# Patient Record
Sex: MALE | Race: BLACK OR AFRICAN AMERICAN | HISPANIC OR LATINO | Employment: FULL TIME | URBAN - METROPOLITAN AREA
[De-identification: names, ages, dates, MRNs, and addresses within clinical notes are randomized per-mention and may not be internally consistent; named-entity substitution may affect disease eponyms.]

---

## 2023-04-27 ENCOUNTER — APPOINTMENT (OUTPATIENT)
Dept: MRI IMAGING | Facility: CLINIC | Age: 48
End: 2023-04-27
Attending: EMERGENCY MEDICINE
Payer: COMMERCIAL

## 2023-04-27 ENCOUNTER — HOSPITAL ENCOUNTER (EMERGENCY)
Facility: CLINIC | Age: 48
Discharge: HOME | End: 2023-04-27
Attending: EMERGENCY MEDICINE | Admitting: EMERGENCY MEDICINE
Payer: COMMERCIAL

## 2023-04-27 ENCOUNTER — APPOINTMENT (OUTPATIENT)
Dept: CT IMAGING | Facility: CLINIC | Age: 48
End: 2023-04-27
Attending: EMERGENCY MEDICINE
Payer: COMMERCIAL

## 2023-04-27 VITALS
SYSTOLIC BLOOD PRESSURE: 140 MMHG | WEIGHT: 200 LBS | HEART RATE: 60 BPM | TEMPERATURE: 98.1 F | DIASTOLIC BLOOD PRESSURE: 86 MMHG | HEIGHT: 73 IN | RESPIRATION RATE: 16 BRPM | OXYGEN SATURATION: 99 % | BODY MASS INDEX: 26.51 KG/M2

## 2023-04-27 DIAGNOSIS — G51.0 BELL'S PALSY: ICD-10-CM

## 2023-04-27 LAB
ANION GAP SERPL CALCULATED.3IONS-SCNC: 10 MMOL/L (ref 5–18)
APTT PPP: 28 SECONDS (ref 22–38)
BASOPHILS # BLD AUTO: 0 10E3/UL (ref 0–0.2)
BASOPHILS NFR BLD AUTO: 0 %
BUN SERPL-MCNC: 12 MG/DL (ref 8–22)
CALCIUM SERPL-MCNC: 9.7 MG/DL (ref 8.5–10.5)
CHLORIDE BLD-SCNC: 103 MMOL/L (ref 98–107)
CO2 SERPL-SCNC: 28 MMOL/L (ref 22–31)
CREAT SERPL-MCNC: 1.06 MG/DL (ref 0.7–1.3)
EOSINOPHIL # BLD AUTO: 0.2 10E3/UL (ref 0–0.7)
EOSINOPHIL NFR BLD AUTO: 4 %
ERYTHROCYTE [DISTWIDTH] IN BLOOD BY AUTOMATED COUNT: 12.1 % (ref 10–15)
GFR SERPL CREATININE-BSD FRML MDRD: 87 ML/MIN/1.73M2
GLUCOSE BLD-MCNC: 89 MG/DL (ref 70–125)
GLUCOSE BLDC GLUCOMTR-MCNC: 84 MG/DL (ref 70–99)
HCT VFR BLD AUTO: 43 % (ref 40–53)
HGB BLD-MCNC: 13.8 G/DL (ref 13.3–17.7)
IMM GRANULOCYTES # BLD: 0 10E3/UL
IMM GRANULOCYTES NFR BLD: 0 %
INR PPP: 0.95 (ref 0.85–1.15)
LYMPHOCYTES # BLD AUTO: 2.3 10E3/UL (ref 0.8–5.3)
LYMPHOCYTES NFR BLD AUTO: 47 %
MCH RBC QN AUTO: 27.4 PG (ref 26.5–33)
MCHC RBC AUTO-ENTMCNC: 32.1 G/DL (ref 31.5–36.5)
MCV RBC AUTO: 85 FL (ref 78–100)
MONOCYTES # BLD AUTO: 0.4 10E3/UL (ref 0–1.3)
MONOCYTES NFR BLD AUTO: 7 %
NEUTROPHILS # BLD AUTO: 2.1 10E3/UL (ref 1.6–8.3)
NEUTROPHILS NFR BLD AUTO: 42 %
NRBC # BLD AUTO: 0 10E3/UL
NRBC BLD AUTO-RTO: 0 /100
PLATELET # BLD AUTO: 230 10E3/UL (ref 150–450)
POTASSIUM BLD-SCNC: 3.9 MMOL/L (ref 3.5–5)
RBC # BLD AUTO: 5.04 10E6/UL (ref 4.4–5.9)
SODIUM SERPL-SCNC: 141 MMOL/L (ref 136–145)
TROPONIN I SERPL-MCNC: <0.01 NG/ML (ref 0–0.29)
WBC # BLD AUTO: 5 10E3/UL (ref 4–11)

## 2023-04-27 PROCEDURE — 70450 CT HEAD/BRAIN W/O DYE: CPT | Mod: XU

## 2023-04-27 PROCEDURE — 70498 CT ANGIOGRAPHY NECK: CPT

## 2023-04-27 PROCEDURE — 99285 EMERGENCY DEPT VISIT HI MDM: CPT | Mod: 25

## 2023-04-27 PROCEDURE — 70496 CT ANGIOGRAPHY HEAD: CPT

## 2023-04-27 PROCEDURE — 85014 HEMATOCRIT: CPT | Performed by: EMERGENCY MEDICINE

## 2023-04-27 PROCEDURE — 85610 PROTHROMBIN TIME: CPT | Performed by: EMERGENCY MEDICINE

## 2023-04-27 PROCEDURE — 250N000011 HC RX IP 250 OP 636: Performed by: EMERGENCY MEDICINE

## 2023-04-27 PROCEDURE — 250N000012 HC RX MED GY IP 250 OP 636 PS 637: Performed by: EMERGENCY MEDICINE

## 2023-04-27 PROCEDURE — 36415 COLL VENOUS BLD VENIPUNCTURE: CPT | Performed by: EMERGENCY MEDICINE

## 2023-04-27 PROCEDURE — 84484 ASSAY OF TROPONIN QUANT: CPT | Performed by: EMERGENCY MEDICINE

## 2023-04-27 PROCEDURE — 99207 PR NO BILLABLE SERVICE THIS VISIT: CPT | Performed by: PHYSICIAN ASSISTANT

## 2023-04-27 PROCEDURE — A9585 GADOBUTROL INJECTION: HCPCS | Performed by: EMERGENCY MEDICINE

## 2023-04-27 PROCEDURE — 255N000002 HC RX 255 OP 636: Performed by: EMERGENCY MEDICINE

## 2023-04-27 PROCEDURE — 85730 THROMBOPLASTIN TIME PARTIAL: CPT | Performed by: EMERGENCY MEDICINE

## 2023-04-27 PROCEDURE — 80048 BASIC METABOLIC PNL TOTAL CA: CPT | Performed by: EMERGENCY MEDICINE

## 2023-04-27 PROCEDURE — 93005 ELECTROCARDIOGRAM TRACING: CPT | Performed by: EMERGENCY MEDICINE

## 2023-04-27 PROCEDURE — 82962 GLUCOSE BLOOD TEST: CPT

## 2023-04-27 PROCEDURE — 70553 MRI BRAIN STEM W/O & W/DYE: CPT

## 2023-04-27 RX ORDER — PREDNISONE 20 MG/1
TABLET ORAL
Qty: 23 TABLET | Refills: 0 | Status: SHIPPED | OUTPATIENT
Start: 2023-04-27

## 2023-04-27 RX ORDER — GADOBUTROL 604.72 MG/ML
9 INJECTION INTRAVENOUS ONCE
Status: COMPLETED | OUTPATIENT
Start: 2023-04-27 | End: 2023-04-27

## 2023-04-27 RX ORDER — IOPAMIDOL 755 MG/ML
75 INJECTION, SOLUTION INTRAVASCULAR ONCE
Status: COMPLETED | OUTPATIENT
Start: 2023-04-27 | End: 2023-04-27

## 2023-04-27 RX ORDER — PREDNISONE 20 MG/1
60 TABLET ORAL ONCE
Status: COMPLETED | OUTPATIENT
Start: 2023-04-27 | End: 2023-04-27

## 2023-04-27 RX ADMIN — PREDNISONE 60 MG: 20 TABLET ORAL at 17:48

## 2023-04-27 RX ADMIN — IOPAMIDOL 75 ML: 755 INJECTION, SOLUTION INTRAVENOUS at 14:58

## 2023-04-27 RX ADMIN — GADOBUTROL 9 ML: 604.72 INJECTION INTRAVENOUS at 16:52

## 2023-04-27 ASSESSMENT — ENCOUNTER SYMPTOMS
WEAKNESS: 0
FACIAL ASYMMETRY: 1
NUMBNESS: 0

## 2023-04-27 ASSESSMENT — ACTIVITIES OF DAILY LIVING (ADL)
ADLS_ACUITY_SCORE: 35
ADLS_ACUITY_SCORE: 35

## 2023-04-27 NOTE — ED PROVIDER NOTES
EMERGENCY DEPARTMENT ENCOUNTER      NAME: Fabián Dominguez  AGE: 48 year old male  YOB: 1975  MRN: [id number]  EVALUATION DATE & TIME: 4/27/2023  2:47 PM    PCP: No primary care provider on file.    ED PROVIDER: Lazara Andrade MD    Chief Complaint   Patient presents with     Facial Droop         FINAL IMPRESSION:  1. Bell's palsy          ED COURSE & MEDICAL DECISION MAKING:    Pertinent Labs & Imaging studies reviewed. (See chart for details)  48 year old male presents to the Emergency Department for evaluation of     Nursing note were reviewed.  Past Medical records were reviewed.  Differential diagnosis includes CVA (either ischemic or hemorrhagic), hypoglycemia, complex Migraine, CNS infection, Mass, MS, drug intoxication, Seizure with post-ictal Jean Carlos's paralysis, and Bell's Palsy.     Blood sugar on arrival was normal and patient has no h/o of migraine or seizure and had no symptoms prior or during to this event to indicate seizure.    Patient is currently afebrile with noevidence of focal infection per history and physical exam to indicate CNS infection.     Stat head CT and CT angio were ordered soon after history, physical and brief neuro exam.    Head CT showed no bleed. Neurology was consulted and was involved with the care plan as noted below.     All labs were unremarkable.     EKG showed sinus rhythm, with no ST or T wave changes to suggest ischemia or infarction, no prior EKGs for comparison, normal QTc.    Repeat neurological exam showed no change.  After work-up in the emergency department, repeat exam, and discussions with neurology, we felt patient was safe to be discharged home with treatment for Bell's palsy.  I discussed this with patient, we began steroids at this time.  Patient will take his steroids as prescribed and follow-up closely with neurology and his primary care physician.      Patient discharged in good condition with questions answered. He was given epic discharge  instructions and follow-up recommendations. Patient will return to the ED if symptoms worsen or he develops any of the concerning signs/symptoms as outlined in the EPIC d/c instructions. Otherwise he will follow up in clinic as recommended in the d/c instructions.             12:43 PM Provider called to triage for a neuro exam. Exam performed, history obtained and plan for treatment discussed   12:44 PM T1 stroke code called  2:49 PM I spoke with stroke neurology  3:04 PM I spoke with [facility] regarding the patient's CT scan. They report the patient's initial non contrast CT scan is negative for stroke   3:09 PM Stroke code deescalated   3:10 PM I spoke with Stroke Neurology who recommends deescalating the stroke code and obtaining an MRI.   3:11 PM I updated the patient with imaging results and plan for MRI from Neurology  5:30 PM discussed MRI results with stroke neurology, who stated they felt comfortable that this was not a central etiology, and that patient could be discharged with treatment for Bell's palsy.    At the conclusion of the encounter I discussed the results of all of the tests and the disposition. The questions were answered. The patient or family acknowledged understanding and was agreeable with the care plan.       MEDICATIONS GIVEN IN THE EMERGENCY:  Medications   iopamidol (ISOVUE-370) solution 75 mL (75 mLs Intravenous $Given 4/27/23 8391)   gadobutrol (GADAVIST) injection 9 mL (9 mLs Intravenous $Given 4/27/23 2382)   predniSONE (DELTASONE) tablet 60 mg (60 mg Oral $Given 4/27/23 9695)       NEW PRESCRIPTIONS STARTED AT TODAY'S ER VISIT  Discharge Medication List as of 4/27/2023  5:42 PM      START taking these medications    Details   predniSONE (DELTASONE) 20 MG tablet Take 3 tabs by mouth daily x 4 days, then 2 tabs daily x 3 days, then 1 tab daily x 3 days, then 1/2 tab daily x 3 days., Disp-23 tablet, R-0, Local Print            Medical Decision  "Making    History:    Supplemental history from: Documented in chart, if applicable    External Record(s) reviewed: Documented in chart, if applicable.    Work Up:    Chart documentation includes differential considered and any EKGs or imaging independently interpreted by provider, where specified.    In additional to work up documented, I considered the following work up: Documented in chart, if applicable.    External consultation:    Discussion of management with another provider: Documented in chart, if applicable and Neurology and [facility]     Complicating factors:    Care impacted by chronic illness: Hyperlipidemia    Care affected by social determinants of health: N/A    Disposition considerations: Discharge. I prescribed additional prescription strength medication(s) as charted. See documentation for any additional details.      =================================================================    HPI    Patient information was obtained from: Patient     Use of : N/A      Fabián Dominguez is a 48 year old male with a pertinent history of HLD who presents to this ED via walk-in for evaluation of facial droop     The patient reports about two hours ago (around 12:30 PM, today) they noticed their right \"eye felt weird\" and they had a hard time closing it. The patient then noticed they hard a hard time smiling and talking due to a right sided facial droop. The patient denies difficulty walking or weakness or numbness in the arms or legs.   The patient notes they had fever and chills a couple of days ago that they thought was COVID. The patient denies those symptoms now.   The patient denies medical problems or taking any medications.    Of note, the patient's blood glucose was 84.     REVIEW OF SYSTEMS   Review of Systems   Neurological: Positive for facial asymmetry (Right sided facial droop). Negative for weakness and numbness.    10 point ROS negative with exception of those listed in the " "HPI.      PAST MEDICAL HISTORY:  No past medical history on file.    PAST SURGICAL HISTORY:  No past surgical history on file.        CURRENT MEDICATIONS:    predniSONE (DELTASONE) 20 MG tablet        ALLERGIES:  Allergies   Allergen Reactions     Sulfa Antibiotics Rash       FAMILY HISTORY:  No family history on file.    SOCIAL HISTORY:        VITALS:  BP (!) 140/86   Pulse 60   Temp 98.1  F (36.7  C) (Oral)   Resp 16   Ht 1.854 m (6' 1\")   Wt 90.7 kg (200 lb)   SpO2 99%   BMI 26.39 kg/m      PHYSICAL EXAM    General: Alert, sitting on the bed in NAD  Neuro: Awake alert &O x 3, no pronator drift present; strength 5/5 at elbow flexion; strength 5/5 at elbow extension with good  strength bilaterally; strength 5/5 at ankle dorsiflexion; strength 5/5 at ankle plantar flexion; tandem gait is in tact; finger-to-nose is intact and symmetrical; sensation is  intact to light touch throughout; CN normal except right sided facial droop, but the patient is able to lift both eyebrows equally  Head: atraumatic, no maxillary no frontal sinus tenderness  Nose:  no rhinnhorea  Eyes: Pupils 3mm equal round and reactive to light  Mouth: mmm  Neck: no carotid bruits  Cardiovascular: regular; S1/S2 ; radial pulses equal and symmetrical  Lungs: CTAB bilaterally  Abdomen:  soft non-tender +BS     LAB:  All pertinent labs reviewed and interpreted.  Results for orders placed or performed during the hospital encounter of 04/27/23   CT Head w/o Contrast    Impression    IMPRESSION:   HEAD CT:  1.  No acute transcortical infarct, intracranial hemorrhage, or mass effect.    HEAD CTA:  1.  No proximal branch vessel occlusion, flow-limiting stenosis, aneurysm, or vascular malformation.    NECK CTA:  1.  No flow-limiting stenosis or findings of dissection.   2.  Mildly enlarged left level 2A lymph nodes are nonspecific and may be reactive to recent illness. Apparent asymmetry with nonmasslike soft tissue fullness at the left oropharynx " is indeterminant. Correlate clinically and with direct visualization on   exam.    Jono Nguyen MD notified provider, TAZ MEZA, of CT head results via telephone at 4/27/2023 3:03 PM CDT, who acknowledge understanding. Discussed CTA results at 3:18 PM.   CTA Head Neck with Contrast    Impression    IMPRESSION:   HEAD CT:  1.  No acute transcortical infarct, intracranial hemorrhage, or mass effect.    HEAD CTA:  1.  No proximal branch vessel occlusion, flow-limiting stenosis, aneurysm, or vascular malformation.    NECK CTA:  1.  No flow-limiting stenosis or findings of dissection.   2.  Mildly enlarged left level 2A lymph nodes are nonspecific and may be reactive to recent illness. Apparent asymmetry with nonmasslike soft tissue fullness at the left oropharynx is indeterminant. Correlate clinically and with direct visualization on   exam.    Jono Nguyen MD notified provider, TAZ MEZA, of CT head results via telephone at 4/27/2023 3:03 PM CDT, who acknowledge understanding. Discussed CTA results at 3:18 PM.   MR Brain w/o & w Contrast    Impression    IMPRESSION:  1.  No acute intracranial process.  2.  Mild presumed microvascular ischemic changes as detailed above.   Basic metabolic panel   Result Value Ref Range    Sodium 141 136 - 145 mmol/L    Potassium 3.9 3.5 - 5.0 mmol/L    Chloride 103 98 - 107 mmol/L    Carbon Dioxide (CO2) 28 22 - 31 mmol/L    Anion Gap 10 5 - 18 mmol/L    Urea Nitrogen 12 8 - 22 mg/dL    Creatinine 1.06 0.70 - 1.30 mg/dL    Calcium 9.7 8.5 - 10.5 mg/dL    Glucose 89 70 - 125 mg/dL    GFR Estimate 87 >60 mL/min/1.73m2   Result Value Ref Range    INR 0.95 0.85 - 1.15   Partial thromboplastin time   Result Value Ref Range    aPTT 28 22 - 38 Seconds   Result Value Ref Range    Troponin I <0.01 0.00 - 0.29 ng/mL   CBC with platelets and differential   Result Value Ref Range    WBC Count 5.0 4.0 - 11.0 10e3/uL    RBC Count 5.04 4.40 - 5.90 10e6/uL    Hemoglobin 13.8 13.3 - 17.7  g/dL    Hematocrit 43.0 40.0 - 53.0 %    MCV 85 78 - 100 fL    MCH 27.4 26.5 - 33.0 pg    MCHC 32.1 31.5 - 36.5 g/dL    RDW 12.1 10.0 - 15.0 %    Platelet Count 230 150 - 450 10e3/uL    % Neutrophils 42 %    % Lymphocytes 47 %    % Monocytes 7 %    % Eosinophils 4 %    % Basophils 0 %    % Immature Granulocytes 0 %    NRBCs per 100 WBC 0 <1 /100    Absolute Neutrophils 2.1 1.6 - 8.3 10e3/uL    Absolute Lymphocytes 2.3 0.8 - 5.3 10e3/uL    Absolute Monocytes 0.4 0.0 - 1.3 10e3/uL    Absolute Eosinophils 0.2 0.0 - 0.7 10e3/uL    Absolute Basophils 0.0 0.0 - 0.2 10e3/uL    Absolute Immature Granulocytes 0.0 <=0.4 10e3/uL    Absolute NRBCs 0.0 10e3/uL   Glucose by meter   Result Value Ref Range    GLUCOSE BY METER POCT 84 70 - 99 mg/dL   ECG 12-LEAD WITH MUSE (LHE)   Result Value Ref Range    Systolic Blood Pressure 165 mmHg    Diastolic Blood Pressure 81 mmHg    Ventricular Rate 70 BPM    Atrial Rate 70 BPM    OK Interval 172 ms    QRS Duration 82 ms     ms    QTc 423 ms    P Axis 51 degrees    R AXIS -3 degrees    T Axis 18 degrees    Interpretation ECG       Sinus rhythm  Normal ECG  No previous ECGs available  Confirmed by SEE ED PROVIDER NOTE FOR, ECG INTERPRETATION ([id number]),  ANAIS MCKEON ([id number]) on 4/28/2023 1:18:58 AM         RADIOLOGY:  Reviewed all pertinent imaging. Please see official radiology report.  MR Brain w/o & w Contrast   Final Result   IMPRESSION:   1.  No acute intracranial process.   2.  Mild presumed microvascular ischemic changes as detailed above.      CTA Head Neck with Contrast   Final Result   IMPRESSION:    HEAD CT:   1.  No acute transcortical infarct, intracranial hemorrhage, or mass effect.      HEAD CTA:   1.  No proximal branch vessel occlusion, flow-limiting stenosis, aneurysm, or vascular malformation.      NECK CTA:   1.  No flow-limiting stenosis or findings of dissection.    2.  Mildly enlarged left level 2A lymph nodes are nonspecific and may be reactive to recent  illness. Apparent asymmetry with nonmasslike soft tissue fullness at the left oropharynx is indeterminant. Correlate clinically and with direct visualization on    exam.      Jono Nguyen MD notified provider, TAZ ANDRADE, of CT head results via telephone at 4/27/2023 3:03 PM CDT, who acknowledge understanding. Discussed CTA results at 3:18 PM.      CT Head w/o Contrast   Final Result   IMPRESSION:    HEAD CT:   1.  No acute transcortical infarct, intracranial hemorrhage, or mass effect.      HEAD CTA:   1.  No proximal branch vessel occlusion, flow-limiting stenosis, aneurysm, or vascular malformation.      NECK CTA:   1.  No flow-limiting stenosis or findings of dissection.    2.  Mildly enlarged left level 2A lymph nodes are nonspecific and may be reactive to recent illness. Apparent asymmetry with nonmasslike soft tissue fullness at the left oropharynx is indeterminant. Correlate clinically and with direct visualization on    exam.      Jono Nguyen MD notified provider, TAZ ANDRADE, of CT head results via telephone at 4/27/2023 3:03 PM CDT, who acknowledge understanding. Discussed CTA results at 3:18 PM.            I, Inez Valente, am serving as a scribe to document services personally performed by Taz Andrade MD based on my observation and the provider's statements to me. I, Taz Andrade MD, attest that Inez Valente is acting in a scribe capacity, has observed my performance of the services and has documented them in accordance with my direction.    Taz Andrade MD  [facility] EMERGENCY ROOM  [address] [place] [address] [phone]     Taz Andrade MD  04/29/23 2318

## 2023-04-27 NOTE — DISCHARGE INSTRUCTIONS
Thank you for choosing [facility] for your care. It was a pleasure taking care of you today in our Emergency Department.     Please follow up with your primary care provider in the next 2-3 days with any questions or concerns. However, if you have continued worsening symptoms, please return to the emergency department.

## 2023-04-27 NOTE — CONSULTS
[facility]    Stroke Telephone Note    I was called by Lazara Andrade on 04/27/23 regarding patient Fabián Dominguez. The patient is a 48 year old male with pertinent past medical history of HLD.    He presented to [facility] ED today due to acute onset 1249 PM of R lower facial droop, no other focal deficits. /81. BG 84.    Stroke Code Data (for stroke code without tele)  Stroke code activated 04/27/23   1445   Stroke provider first response  04/27/23   1448            Last known normal 04/27/23   1248        Time of discovery   (or onset of symptoms) 04/27/23   1249   Head CT read by Stroke Neuro Dr/Provider 04/27/23   1451   Was stroke code de-escalated? Yes 04/27/23 1510          Imaging Findings   HEAD CT:  1.  No acute transcortical infarct, intracranial hemorrhage, or mass effect.     HEAD CTA:  1.  No proximal branch vessel occlusion, flow-limiting stenosis, aneurysm, or vascular malformation.     NECK CTA:  1.  No flow-limiting stenosis or findings of dissection.   2.  Mildly enlarged left level 2A lymph nodes are nonspecific and may be reactive to recent illness. Apparent asymmetry with nonmasslike soft tissue fullness at the left oropharynx is indeterminant. Correlate clinically and with direct visualization on   exam.    Intravenous Thrombolysis  Not given due to:   - minor/isolated/quickly resolving symptoms    Endovascular Treatment  Not initiated due to absence of proximal vessel occlusion    Impression  R lower facial droop, rule out acute stroke, if negative then evaluate for possible Bell's palsy    Recommendations   -MRI brain w/wo contrast, please page stroke team once resulted so we can review imaging and make further recommendations    My recommendations are based on the information provided over the phone by Fabián Dominguez's in-person providers. They are not intended to replace the clinical judgment of his in-person providers. I was not requested to personally see or  "examine the patient at this time.    Yolanda Cuevas PA-C  Vascular Neurology    To page me or covering stroke neurology team member, click here: [facility]COM  Choose \"On Call\" tab at top, then select \"NEUROLOGY/ALL SITES\" from middle drop-down box, press Enter, then look for \"stroke\" or \"telestroke\" for your site.      "

## 2023-04-27 NOTE — ED NOTES
Stroke code de-escalated at 1510 per Stroke Neuro   Yolanda Cuevas PA-C. Handoff report given to ED nurse Paulino CAMARA RN.

## 2023-04-27 NOTE — ED TRIAGE NOTES
Pt presents with right sided facial droop beginning around 1230 while working on his computer. No other deficits noted     Triage Assessment     Row Name 04/27/23 1493       Triage Assessment (Adult)    Airway WDL WDL       Respiratory WDL    Respiratory WDL WDL       Skin Circulation/Temperature WDL    Skin Circulation/Temperature WDL WDL       Cardiac WDL    Cardiac WDL WDL       Peripheral/Neurovascular WDL    Peripheral Neurovascular WDL WDL       Cognitive/Neuro/Behavioral WDL    Cognitive/Neuro/Behavioral WDL WDL       Pupils (CN II)    Pupil PERRLA yes    Pupil Size Left 3 mm  right sided facial droop; able to move right eyebrow    Pupil Shape Left round    Pupil Accommodation Left normal response    Pupil Size Right 3 mm    Pupil Shape Right round    Pupil Reaction Right brisk    Pupil Accommodation Right normal response

## 2023-04-28 LAB
ATRIAL RATE - MUSE: 70 BPM
DIASTOLIC BLOOD PRESSURE - MUSE: 81 MMHG
INTERPRETATION ECG - MUSE: NORMAL
P AXIS - MUSE: 51 DEGREES
PR INTERVAL - MUSE: 172 MS
QRS DURATION - MUSE: 82 MS
QT - MUSE: 392 MS
QTC - MUSE: 423 MS
R AXIS - MUSE: -3 DEGREES
SYSTOLIC BLOOD PRESSURE - MUSE: 165 MMHG
T AXIS - MUSE: 18 DEGREES
VENTRICULAR RATE- MUSE: 70 BPM